# Patient Record
(demographics unavailable — no encounter records)

---

## 2021-12-31 NOTE — OR
DATE OF PROCEDURE:  12/30/2021

 

SURGEON:  Jose Ramon Amezcua MD

 

PREOPERATIVE DIAGNOSIS:  Intolerance to laparoscopic adjustable gastric band.

 

POSTOPERATIVE DIAGNOSES:  Intolerance to laparoscopic adjustable gastric band.

1. Marked dilation of esophagus with retained bile and food fragments within the distal

    esophagus.

2. Active inflammation at esophagogastric junction with possible [place] esophagus.

3. Antral gastritis with a single erosion.

 

OPERATIVE PROCEDURE:  Upper gastrointestinal endoscopy with:

1. Biopsy of esophagogastric junction for histologic evaluation.

2. Mobilization of retained food within esophagus and to the more distal stomach (95492).

3. Biopsies of the antrum for CLOtest ([id number]).

 

ANESTHESIA:  IV sedation.

 

INDICATION FOR PROCEDURE:  The patient status post laparoscopic adjustable gastric band.

She over the last year or more has had increasing problems with severe reflux symptoms,

vomiting, and quite often cough with a distant aspiration of the esophageal contents when

sleeping as such. All the fluid was then deflated out of the band for several months, then

she presently is on twice a day regimen of Protonix 40 mg.  Despite that, she has ongoing

symptoms and is interested in having the band removed.  Plan is to proceed with upper GI

endoscopy for evaluation of the current anatomic status.  Potential risks of the procedure

including bleeding and perforation were discussed and the patient wishes to proceed.

 

DETAILS OF PROCEDURE:  The patient was taken to the operating room and placed in a left

lateral decubitus position.  IV sedation was administered after which the upper GI endoscope

was passed orally through the length of the esophagus across the esophagogastric junction in

the banded portion of the stomach and into the area of the band into the area of the stomach

and through the pyloric channel into the junction of third and fourth portions of the

duodenum.

 

Findings included markedly dilated esophagus.  This contained bile as well as fragments of

old food pushed down to the banded portion to avoid problems with aspiration of those

contents in the immediate postoperative period.   __________ marked dilation with active

inflammation in the esophagogastric junction with the mucosa in that area being reddened and

quite friable.  There is also some possible [place] esophagus through both some upward

extension of the columnar mucosa as well as islands of columnar mucosa within the squamous

mucosa in the area of the esophagogastric junction.  Remainder of the stomach revealed some

reddening of the antrum and a very small amount of fibrinous exudate.  Pyloric channel and

visualized portions of the duodenum were unremarkable.

 

At this point, biopsies were obtained from the esophagogastric junction and sent for CLOtest

for H pylori.  The patient is having ongoing treatment of her H pylori.  This will be an

interesting finding.  Apart from that, there were multiple biopsies obtained from the

esophagogastric junction, sent for histologic evaluation.  No bleeding from the biopsy sites

was seen.  The procedure then concluded.

 

At this point, the patient clearly meets criteria for removal of the band with esophageal

dilation, retained food within the esophagus, and complications leading to death including

reflux, vomiting, and probable aspiration of esophageal contents related to the chronic

cough issues  as developed.  Plan will be to obtain prior authorization with her insurance

 regarding the band removal.

 

 

 

 

Jose Ramon Amezcua MD

DD:  12/31/2021 03:23:02

DT:  12/31/2021 10:27:42

Job #:  [id number]